# Patient Record
Sex: MALE | Race: WHITE | NOT HISPANIC OR LATINO | Employment: OTHER | URBAN - METROPOLITAN AREA
[De-identification: names, ages, dates, MRNs, and addresses within clinical notes are randomized per-mention and may not be internally consistent; named-entity substitution may affect disease eponyms.]

---

## 2018-10-16 ENCOUNTER — TELEPHONE (OUTPATIENT)
Dept: CARDIOLOGY | Facility: MEDICAL CENTER | Age: 78
End: 2018-10-16

## 2018-10-25 ENCOUNTER — OFFICE VISIT (OUTPATIENT)
Dept: CARDIOLOGY | Facility: MEDICAL CENTER | Age: 78
End: 2018-10-25
Payer: MEDICARE

## 2018-10-25 VITALS
OXYGEN SATURATION: 98 % | HEART RATE: 60 BPM | DIASTOLIC BLOOD PRESSURE: 60 MMHG | HEIGHT: 71 IN | SYSTOLIC BLOOD PRESSURE: 130 MMHG | WEIGHT: 264.77 LBS | BODY MASS INDEX: 37.07 KG/M2

## 2018-10-25 DIAGNOSIS — I10 ESSENTIAL HYPERTENSION: ICD-10-CM

## 2018-10-25 LAB — EKG IMPRESSION: NORMAL

## 2018-10-25 PROCEDURE — 93000 ELECTROCARDIOGRAM COMPLETE: CPT | Performed by: INTERNAL MEDICINE

## 2018-10-25 PROCEDURE — 99204 OFFICE O/P NEW MOD 45 MIN: CPT | Mod: 25 | Performed by: INTERNAL MEDICINE

## 2018-10-25 RX ORDER — CARVEDILOL 25 MG/1
TABLET ORAL
COMMUNITY
Start: 2018-06-18

## 2018-10-25 RX ORDER — CHLORAL HYDRATE 500 MG
1000 CAPSULE ORAL
COMMUNITY

## 2018-10-25 RX ORDER — CETIRIZINE HYDROCHLORIDE 10 MG/1
10 TABLET ORAL
COMMUNITY

## 2018-10-25 RX ORDER — DUTASTERIDE 0.5 MG/1
0.5 CAPSULE, LIQUID FILLED ORAL
COMMUNITY
Start: 2018-06-18

## 2018-10-25 RX ORDER — QUINAPRIL 40 MG/1
40 TABLET ORAL
COMMUNITY
Start: 2018-06-18

## 2018-10-25 RX ORDER — TRIAMCINOLONE ACETONIDE 1 MG/G
1 CREAM TOPICAL
COMMUNITY

## 2018-10-25 RX ORDER — AMLODIPINE BESYLATE 10 MG/1
10 TABLET ORAL
COMMUNITY
Start: 2018-10-08

## 2018-10-25 ASSESSMENT — ENCOUNTER SYMPTOMS
DECREASED LIBIDO: 0
COUGH: 0
NAIL CHANGES: 0
CLAUDICATION: 0
BACK PAIN: 0
BRIEF PARALYSIS: 0
DECREASED APPETITE: 0
ALTERED MENTAL STATUS: 0
ABDOMINAL PAIN: 0
APHONIA: 0
COLOR CHANGE: 0
DEPRESSION: 0
ADENOPATHY: 0
EYE DISCHARGE: 0
BLOATING: 0
CHILLS: 0
HEMOPTYSIS: 0
BLURRED VISION: 0

## 2018-10-25 NOTE — PROGRESS NOTES
Cardiology Initial Consultation Note    Date of note:    10/25/2018    Primary Care Provider: Asia Cox M.D.    Patient Name: Jose Childs     YOB: 1940  MRN:              [id number]    Chief Complaint: Pre-op cardiovascular exam, HTN    Jose Childs is a 78 y.o. male recently moved to [place] to establish care with cardiology and get preop cardiovascular evaluation.  Patient is to follow-up with [facility], he moved to [place] to be closer to his kids.  He takes care of his wife who has dementia.  He is going through a lot of stress.  The last few months he was diagnosed with prostate cancer underwent radiation therapy.  After the radiation therapy he has been noticing swelling in his feet, which usually disappear in the morning when he wakes up.  He is not very active physically, walks daily.  Denies chest pain shortness of breath with activity.  No prior cardiovascular history.  No prior history of CVA.  He has been having significant left knee pain, hoping to get left knee replacement soon.    Review of Systems   Constitution: Negative for chills and decreased appetite.   HENT: Negative for congestion and ear discharge.    Eyes: Negative for blurred vision and discharge.   Cardiovascular: Negative for chest pain and claudication.   Respiratory: Negative for cough and hemoptysis.    Endocrine: Negative for cold intolerance and heat intolerance.   Hematologic/Lymphatic: Negative for adenopathy.   Skin: Negative for color change and nail changes.   Musculoskeletal: Negative for back pain.   Gastrointestinal: Negative for bloating and abdominal pain.   Genitourinary: Negative for bladder incontinence and decreased libido.   Neurological: Negative for aphonia and brief paralysis.   Psychiatric/Behavioral: Negative for altered mental status and depression.   Allergic/Immunologic: Negative for environmental allergies.         All other systems reviewed and discussed using a comprehensive  "questionnaire and are negative.     Past medical history, family history, social history, allergies and labs are reviewed and updated as needed as documented below.    Past Medical History:   Diagnosis Date   • Hypertension    Prostate cancer        Current Outpatient Prescriptions   Medication Sig Dispense Refill   • amLODIPine (NORVASC) 10 MG Tab Take 10 mg by mouth.     • carvedilol (COREG) 25 MG Tab TAKE 1 TAB BY MOUTH TWICE DAILY. TAKE 25 MG BY MOUTH TWICE DAILY WITH BREAKFAST AND DINNER.     • dutasteride (AVODART) 0.5 MG capsule Take 0.5 mg by mouth.     • Omega-3 Fatty Acids (FISH OIL) 1000 MG Cap capsule Take 1,000 mg by mouth.     • quinapril (ACCUPRIL) 40 MG tablet Take 40 mg by mouth.     • triamcinolone acetonide (KENALOG) 0.1 % Cream Apply 1 Application to affected area(s).     • cetirizine (ZYRTEC) 10 MG Tab Take 10 mg by mouth.     • Multiple Vitamin (MULTI-VITAMIN DAILY PO) Take  by mouth.       No current facility-administered medications for this visit.        No Known Allergies      Family History   Problem Relation Age of Onset   • Heart Disease Mother          Social History     Social History   • Marital status:      Spouse name: N/A   • Number of children: N/A   • Years of education: N/A     Occupational History   • Not on file.     Social History Main Topics   • Smoking status: Never Smoker   • Smokeless tobacco: Never Used   • Alcohol use No   • Drug use: Unknown   • Sexual activity: Not on file      Comment: 2-3 drinks daily prior to living with son     Other Topics Concern   • Not on file     Social History Narrative   • No narrative on file         Physical Exam:  Ambulatory Vitals  Blood pressure 130/60, pulse 60, height 1.803 m (5' 11\"), weight 120.1 kg (264 lb 12.4 oz), SpO2 98 %.   Oxygen Therapy:  Pulse Oximetry: 98 %  BP Readings from Last 4 Encounters:   10/25/18 130/60       Weight/BMI: Body mass index is 36.93 kg/m².  Wt Readings from Last 4 Encounters:   10/25/18 120.1 " kg (264 lb 12.4 oz)           General: Well appearing and in no apparent distress  Head: atrumatic  Eyes: No conjunctival pallor   ENT: normal external appearance of nose and ears  Neck: JVP absent, carotid bruits absent  Lungs: respiratory sounds  normal, additional breath sounds absent  Heart: Regular rhythm,   No palpable thrills on palpation, 2/6 systolic murmur aortic area, no rubs,   Lowe extremity edema present.   Pedal pulses normal  Abdomen: soft, non tender, non distended.  Extremities/MSK: no clubbing, no cyanosis  Neurological: normal orientation, Gait normal   Psychiatric: Appropriate affect, intact judgement and insight  Skin: Warm extremities        Cardiac Imaging and Procedures Review:    EKG dated 10/25/2018: My personal interpretation is sinus bradycardia, first-degree AV block.    Echo dated 2018 is reviewed:   Normal LV size, systolic function, moderately dilated left atrium mild aortic regurgitation mild aortic stenosis velocity across the aortic valve was 2.6 m/s.    Medical Decision Makin.  Preop cardiovascular examination prior to knee surgery  2.  Hypertension  3.  Prostate cancer, treated with radiation  4.  Lower extremity edema    He has no history of coronary artery disease, his echocardiogram is unremarkable except for mild aortic stenosis.  He has no symptoms of coronary disease clinically.  No history of CVA or kidney disease.  He will be low risk for cardiovascular events during surgery.  In my opinion he is a reasonable candidate for knee surgery.  His blood pressure is controlled on current regimen, will continue current therapy.  His lower extremity edema is likely related to lymphatic obstruction, he started noticing this after the radiation therapy for prostate cancer.  Advise conservative management like compression stockings, elevation of feet while sitting.  I recommend aspirin 81 mg daily, low to moderate intensity statin like Lipitor 10 mg for primary prevention  of coronary artery disease.      Return in about 1 year (around 10/25/2019).    This note was dictated using Dragon speech recognition software.    Celestine ZHENG  Interventional cardiologist  [facility], [facility].  [address] [place] [id number]  Phone: [phone]  Fax: [phone]

## 2018-10-25 NOTE — LETTER
[facility]   [address], [address]  [place] [address]  Phone: [phone]  Fax: [phone]              Jose Chlids  1940    Encounter Date: 10/25/2018    Celestine Espinoza M.D.          PROGRESS NOTE:      Cardiology Initial Consultation Note    Date of note:    10/25/2018    Primary Care Provider: Asia Cox M.D.    Patient Name: Jose Childs     YOB: 1940  MRN:              [id number]    Chief Complaint: Pre-op cardiovascular exam, HTN    Jose Childs is a 78 y.o. male recently moved to [place] to establish care with cardiology and get preop cardiovascular evaluation.  Patient is to follow-up with [facility], he moved to [place] to be closer to his kids.  He takes care of his wife who has dementia.  He is going through a lot of stress.  The last few months he was diagnosed with prostate cancer underwent radiation therapy.  After the radiation therapy he has been noticing swelling in his feet, which usually disappear in the morning when he wakes up.  He is not very active physically, walks daily.  Denies chest pain shortness of breath with activity.  No prior cardiovascular history.  No prior history of CVA.  He has been having significant left knee pain, hoping to get left knee replacement soon.    Review of Systems   Constitution: Negative for chills and decreased appetite.   HENT: Negative for congestion and ear discharge.    Eyes: Negative for blurred vision and discharge.   Cardiovascular: Negative for chest pain and claudication.   Respiratory: Negative for cough and hemoptysis.    Endocrine: Negative for cold intolerance and heat intolerance.   Hematologic/Lymphatic: Negative for adenopathy.   Skin: Negative for color change and nail changes.   Musculoskeletal: Negative for back pain.   Gastrointestinal: Negative for bloating and abdominal pain.   Genitourinary: Negative for bladder incontinence and decreased libido.      Neurological: Negative for aphonia and brief paralysis.   Psychiatric/Behavioral: Negative for altered mental status and depression.   Allergic/Immunologic: Negative for environmental allergies.         All other systems reviewed and discussed using a comprehensive questionnaire and are negative.     Past medical history, family history, social history, allergies and labs are reviewed and updated as needed as documented below.    Past Medical History:   Diagnosis Date   • Hypertension    Prostate cancer        Current Outpatient Prescriptions   Medication Sig Dispense Refill   • amLODIPine (NORVASC) 10 MG Tab Take 10 mg by mouth.     • carvedilol (COREG) 25 MG Tab TAKE 1 TAB BY MOUTH TWICE DAILY. TAKE 25 MG BY MOUTH TWICE DAILY WITH BREAKFAST AND DINNER.     • dutasteride (AVODART) 0.5 MG capsule Take 0.5 mg by mouth.     • Omega-3 Fatty Acids (FISH OIL) 1000 MG Cap capsule Take 1,000 mg by mouth.     • quinapril (ACCUPRIL) 40 MG tablet Take 40 mg by mouth.     • triamcinolone acetonide (KENALOG) 0.1 % Cream Apply 1 Application to affected area(s).     • cetirizine (ZYRTEC) 10 MG Tab Take 10 mg by mouth.     • Multiple Vitamin (MULTI-VITAMIN DAILY PO) Take  by mouth.       No current facility-administered medications for this visit.        No Known Allergies      Family History   Problem Relation Age of Onset   • Heart Disease Mother          Social History     Social History   • Marital status:      Spouse name: N/A   • Number of children: N/A   • Years of education: N/A     Occupational History   • Not on file.     Social History Main Topics   • Smoking status: Never Smoker   • Smokeless tobacco: Never Used   • Alcohol use No   • Drug use: Unknown   • Sexual activity: Not on file      Comment: 2-3 drinks daily prior to living with son     Other Topics Concern   • Not on file     Social History Narrative   • No narrative on file         Physical Exam:  Ambulatory Vitals  Blood pressure 130/60, pulse 60,  "height 1.803 m (5' 11\"), weight 120.1 kg (264 lb 12.4 oz), SpO2 98 %.   Oxygen Therapy:  Pulse Oximetry: 98 %  BP Readings from Last 4 Encounters:   10/25/18 130/60       Weight/BMI: Body mass index is 36.93 kg/m².  Wt Readings from Last 4 Encounters:   10/25/18 120.1 kg (264 lb 12.4 oz)           General: Well appearing and in no apparent distress  Head: atrumatic  Eyes: No conjunctival pallor   ENT: normal external appearance of nose and ears  Neck: JVP absent, carotid bruits absent  Lungs: respiratory sounds  normal, additional breath sounds absent  Heart: Regular rhythm,   No palpable thrills on palpation, 2/6 systolic murmur aortic area, no rubs,   Lowe extremity edema present.   Pedal pulses normal  Abdomen: soft, non tender, non distended.  Extremities/MSK: no clubbing, no cyanosis  Neurological: normal orientation, Gait normal   Psychiatric: Appropriate affect, intact judgement and insight  Skin: Warm extremities        Cardiac Imaging and Procedures Review:    EKG dated 10/25/2018: My personal interpretation is sinus bradycardia, first-degree AV block.    Echo dated 2018 is reviewed:   Normal LV size, systolic function, moderately dilated left atrium mild aortic regurgitation mild aortic stenosis velocity across the aortic valve was 2.6 m/s.    Medical Decision Makin.  Preop cardiovascular examination prior to knee surgery  2.  Hypertension  3.  Prostate cancer, treated with radiation  4.  Lower extremity edema    He has no history of coronary artery disease, his echocardiogram is unremarkable except for mild aortic stenosis.  He has no symptoms of coronary disease clinically.  No history of CVA or kidney disease.  He will be low risk for cardiovascular events during surgery.  In my opinion he is a reasonable candidate for knee surgery.  His blood pressure is controlled on current regimen, will continue current therapy.  His lower extremity edema is likely related to lymphatic obstruction, he " started noticing this after the radiation therapy for prostate cancer.  Advise conservative management like compression stockings, elevation of feet while sitting.  I recommend aspirin 81 mg daily, low to moderate intensity statin like Lipitor 10 mg for primary prevention of coronary artery disease.      Return in about 1 year (around 10/25/2019).    This note was dictated using Dragon speech recognition software.    Celestine ZHENG  Interventional cardiologist  [facility], [facility]  [address] [address]  Phone: [phone]  Fax: [phone]                  No Recipients

## 2018-11-16 ENCOUNTER — TELEPHONE (OUTPATIENT)
Dept: CARDIOLOGY | Facility: MEDICAL CENTER | Age: 78
End: 2018-11-16

## 2018-11-16 NOTE — TELEPHONE ENCOUNTER
Received cardiac clearance request from [facility] for pt upcoming left total knee replacement with Dr. Keys on 11/27.     Pt is not on a blood thinner.     Pt saw Dr. Espinoza for pre-op cardiovascular exam on 10/25 and states in progress note:     He will be low risk for cardiovascular events during surgery.  In my opinion he is a reasonable candidate for knee surgery.    The progress note underlining clearance information and EKG faxed to [facility] pre admissions testing at [phone].

## 2019-04-20 ENCOUNTER — HOSPITAL ENCOUNTER (INPATIENT)
Dept: HOSPITAL 8 - ED | Age: 79
LOS: 3 days | Discharge: HOME | DRG: 871 | End: 2019-04-23
Attending: HOSPITALIST | Admitting: HOSPITALIST
Payer: MEDICARE

## 2019-04-20 VITALS — BODY MASS INDEX: 35.4 KG/M2 | WEIGHT: 252.87 LBS | HEIGHT: 71 IN

## 2019-04-20 VITALS — DIASTOLIC BLOOD PRESSURE: 62 MMHG | SYSTOLIC BLOOD PRESSURE: 144 MMHG

## 2019-04-20 VITALS — SYSTOLIC BLOOD PRESSURE: 126 MMHG | DIASTOLIC BLOOD PRESSURE: 63 MMHG

## 2019-04-20 DIAGNOSIS — Z96.652: ICD-10-CM

## 2019-04-20 DIAGNOSIS — D64.9: ICD-10-CM

## 2019-04-20 DIAGNOSIS — I50.41: ICD-10-CM

## 2019-04-20 DIAGNOSIS — I35.0: ICD-10-CM

## 2019-04-20 DIAGNOSIS — I11.0: ICD-10-CM

## 2019-04-20 DIAGNOSIS — R65.10: ICD-10-CM

## 2019-04-20 DIAGNOSIS — Z87.891: ICD-10-CM

## 2019-04-20 DIAGNOSIS — D72.829: ICD-10-CM

## 2019-04-20 DIAGNOSIS — D68.69: ICD-10-CM

## 2019-04-20 DIAGNOSIS — J15.9: ICD-10-CM

## 2019-04-20 DIAGNOSIS — A41.9: Primary | ICD-10-CM

## 2019-04-20 DIAGNOSIS — Z85.46: ICD-10-CM

## 2019-04-20 DIAGNOSIS — I48.91: ICD-10-CM

## 2019-04-20 DIAGNOSIS — G47.33: ICD-10-CM

## 2019-04-20 LAB
ALBUMIN SERPL-MCNC: 3.2 G/DL (ref 3.4–5)
ALP SERPL-CCNC: 70 U/L (ref 45–117)
ALT SERPL-CCNC: 42 U/L (ref 12–78)
ANION GAP SERPL CALC-SCNC: 9 MMOL/L (ref 5–15)
APTT BLD: 34 SECONDS (ref 25–31)
BASOPHILS # BLD AUTO: 0.05 X10^3/UL (ref 0–0.1)
BASOPHILS NFR BLD AUTO: 0 % (ref 0–1)
BILIRUB SERPL-MCNC: 1.8 MG/DL (ref 0.2–1)
CALCIUM SERPL-MCNC: 8.6 MG/DL (ref 8.5–10.1)
CHLORIDE SERPL-SCNC: 102 MMOL/L (ref 98–107)
CREAT SERPL-MCNC: 0.92 MG/DL (ref 0.7–1.3)
EOSINOPHIL # BLD AUTO: 0 X10^3/UL (ref 0–0.4)
EOSINOPHIL NFR BLD AUTO: 0 % (ref 1–7)
ERYTHROCYTE [DISTWIDTH] IN BLOOD BY AUTOMATED COUNT: 14.9 % (ref 9.4–14.8)
INR PPP: 1.09 (ref 0.93–1.1)
LYMPHOCYTES # BLD AUTO: 1.27 X10^3/UL (ref 1–3.4)
LYMPHOCYTES NFR BLD AUTO: 10 % (ref 22–44)
MCH RBC QN AUTO: 29 PG (ref 27.5–34.5)
MCHC RBC AUTO-ENTMCNC: 33.5 G/DL (ref 33.2–36.2)
MCV RBC AUTO: 86.7 FL (ref 81–97)
MD: NO
MONOCYTES # BLD AUTO: 1.15 X10^3/UL (ref 0.2–0.8)
MONOCYTES NFR BLD AUTO: 9 % (ref 2–9)
NEUTROPHILS # BLD AUTO: 10.06 X10^3/UL (ref 1.8–6.8)
NEUTROPHILS NFR BLD AUTO: 80 % (ref 42–75)
PLATELET # BLD AUTO: 283 X10^3/UL (ref 130–400)
PMV BLD AUTO: 9.5 FL (ref 7.4–10.4)
PROT SERPL-MCNC: 7.2 G/DL (ref 6.4–8.2)
PROTHROMBIN TIME: 11.4 SECONDS (ref 9.6–11.5)
RBC # BLD AUTO: 4.15 X10^6/UL (ref 4.38–5.82)
T4 FREE SERPL-MCNC: 1.49 NG/DL (ref 0.76–1.46)
TROPONIN I SERPL-MCNC: < 0.015 NG/ML (ref 0–0.04)

## 2019-04-20 PROCEDURE — 93306 TTE W/DOPPLER COMPLETE: CPT

## 2019-04-20 PROCEDURE — 84145 PROCALCITONIN (PCT): CPT

## 2019-04-20 PROCEDURE — 85025 COMPLETE CBC W/AUTO DIFF WBC: CPT

## 2019-04-20 PROCEDURE — 83735 ASSAY OF MAGNESIUM: CPT

## 2019-04-20 PROCEDURE — 85610 PROTHROMBIN TIME: CPT

## 2019-04-20 PROCEDURE — 83550 IRON BINDING TEST: CPT

## 2019-04-20 PROCEDURE — 87400 INFLUENZA A/B EACH AG IA: CPT

## 2019-04-20 PROCEDURE — 81003 URINALYSIS AUTO W/O SCOPE: CPT

## 2019-04-20 PROCEDURE — 84484 ASSAY OF TROPONIN QUANT: CPT

## 2019-04-20 PROCEDURE — 87040 BLOOD CULTURE FOR BACTERIA: CPT

## 2019-04-20 PROCEDURE — 99285 EMERGENCY DEPT VISIT HI MDM: CPT

## 2019-04-20 PROCEDURE — 84439 ASSAY OF FREE THYROXINE: CPT

## 2019-04-20 PROCEDURE — 84443 ASSAY THYROID STIM HORMONE: CPT

## 2019-04-20 PROCEDURE — 83605 ASSAY OF LACTIC ACID: CPT

## 2019-04-20 PROCEDURE — 83540 ASSAY OF IRON: CPT

## 2019-04-20 PROCEDURE — 80053 COMPREHEN METABOLIC PANEL: CPT

## 2019-04-20 PROCEDURE — 96374 THER/PROPH/DIAG INJ IV PUSH: CPT

## 2019-04-20 PROCEDURE — 36415 COLL VENOUS BLD VENIPUNCTURE: CPT

## 2019-04-20 PROCEDURE — 83880 ASSAY OF NATRIURETIC PEPTIDE: CPT

## 2019-04-20 PROCEDURE — 85730 THROMBOPLASTIN TIME PARTIAL: CPT

## 2019-04-20 PROCEDURE — 93005 ELECTROCARDIOGRAM TRACING: CPT

## 2019-04-20 RX ADMIN — DOXYCYCLINE SCH MLS/HR: 100 INJECTION, POWDER, LYOPHILIZED, FOR SOLUTION INTRAVENOUS at 16:56

## 2019-04-20 RX ADMIN — HEPARIN SODIUM SCH UNITS: 5000 INJECTION, SOLUTION INTRAVENOUS; SUBCUTANEOUS at 16:31

## 2019-04-20 RX ADMIN — GUAIFENESIN SCH MG: 200 TABLET ORAL at 20:32

## 2019-04-20 RX ADMIN — GUAIFENESIN SCH MG: 200 TABLET ORAL at 16:30

## 2019-04-20 RX ADMIN — METOPROLOL TARTRATE SCH MG: 25 TABLET, FILM COATED ORAL at 16:56

## 2019-04-21 VITALS — DIASTOLIC BLOOD PRESSURE: 69 MMHG | SYSTOLIC BLOOD PRESSURE: 151 MMHG

## 2019-04-21 VITALS — DIASTOLIC BLOOD PRESSURE: 63 MMHG | SYSTOLIC BLOOD PRESSURE: 126 MMHG

## 2019-04-21 VITALS — SYSTOLIC BLOOD PRESSURE: 138 MMHG | DIASTOLIC BLOOD PRESSURE: 67 MMHG

## 2019-04-21 VITALS — DIASTOLIC BLOOD PRESSURE: 54 MMHG | SYSTOLIC BLOOD PRESSURE: 128 MMHG

## 2019-04-21 VITALS — SYSTOLIC BLOOD PRESSURE: 127 MMHG | DIASTOLIC BLOOD PRESSURE: 68 MMHG

## 2019-04-21 LAB
ALBUMIN SERPL-MCNC: 2.8 G/DL (ref 3.4–5)
ALP SERPL-CCNC: 62 U/L (ref 45–117)
ALT SERPL-CCNC: 52 U/L (ref 12–78)
ANION GAP SERPL CALC-SCNC: 8 MMOL/L (ref 5–15)
BASOPHILS # BLD AUTO: 0.02 X10^3/UL (ref 0–0.1)
BASOPHILS NFR BLD AUTO: 0 % (ref 0–1)
BILIRUB SERPL-MCNC: 1.3 MG/DL (ref 0.2–1)
CALCIUM SERPL-MCNC: 8.2 MG/DL (ref 8.5–10.1)
CHLORIDE SERPL-SCNC: 104 MMOL/L (ref 98–107)
CREAT SERPL-MCNC: 0.79 MG/DL (ref 0.7–1.3)
EOSINOPHIL # BLD AUTO: 0.12 X10^3/UL (ref 0–0.4)
EOSINOPHIL NFR BLD AUTO: 1 % (ref 1–7)
ERYTHROCYTE [DISTWIDTH] IN BLOOD BY AUTOMATED COUNT: 15 % (ref 9.4–14.8)
LYMPHOCYTES # BLD AUTO: 1.46 X10^3/UL (ref 1–3.4)
LYMPHOCYTES NFR BLD AUTO: 14 % (ref 22–44)
MCH RBC QN AUTO: 29.2 PG (ref 27.5–34.5)
MCHC RBC AUTO-ENTMCNC: 33.5 G/DL (ref 33.2–36.2)
MCV RBC AUTO: 87 FL (ref 81–97)
MD: NO
MONOCYTES # BLD AUTO: 1.06 X10^3/UL (ref 0.2–0.8)
MONOCYTES NFR BLD AUTO: 10 % (ref 2–9)
NEUTROPHILS # BLD AUTO: 8.17 X10^3/UL (ref 1.8–6.8)
NEUTROPHILS NFR BLD AUTO: 76 % (ref 42–75)
PLATELET # BLD AUTO: 253 X10^3/UL (ref 130–400)
PMV BLD AUTO: 9.3 FL (ref 7.4–10.4)
PROT SERPL-MCNC: 6.5 G/DL (ref 6.4–8.2)
RBC # BLD AUTO: 3.74 X10^6/UL (ref 4.38–5.82)
TROPONIN I SERPL-MCNC: < 0.015 NG/ML (ref 0–0.04)

## 2019-04-21 RX ADMIN — CEFTRIAXONE SCH MLS/HR: 1 INJECTION, SOLUTION INTRAVENOUS at 13:53

## 2019-04-21 RX ADMIN — GUAIFENESIN SCH MG: 200 TABLET ORAL at 20:17

## 2019-04-21 RX ADMIN — DOXYCYCLINE SCH MLS/HR: 100 INJECTION, POWDER, LYOPHILIZED, FOR SOLUTION INTRAVENOUS at 05:18

## 2019-04-21 RX ADMIN — HEPARIN SODIUM SCH UNITS: 5000 INJECTION, SOLUTION INTRAVENOUS; SUBCUTANEOUS at 00:10

## 2019-04-21 RX ADMIN — GUAIFENESIN SCH MG: 200 TABLET ORAL at 11:44

## 2019-04-21 RX ADMIN — GUAIFENESIN SCH MG: 200 TABLET ORAL at 06:38

## 2019-04-21 RX ADMIN — METOPROLOL TARTRATE SCH MG: 25 TABLET, FILM COATED ORAL at 18:05

## 2019-04-21 RX ADMIN — HEPARIN SODIUM SCH UNITS: 5000 INJECTION, SOLUTION INTRAVENOUS; SUBCUTANEOUS at 08:55

## 2019-04-21 RX ADMIN — GUAIFENESIN SCH MG: 200 TABLET ORAL at 18:01

## 2019-04-21 RX ADMIN — DOXYCYCLINE SCH MLS/HR: 100 INJECTION, POWDER, LYOPHILIZED, FOR SOLUTION INTRAVENOUS at 18:05

## 2019-04-21 RX ADMIN — METOPROLOL TARTRATE SCH MG: 25 TABLET, FILM COATED ORAL at 06:39

## 2019-04-21 RX ADMIN — POTASSIUM CHLORIDE SCH MEQ: 20 TABLET, EXTENDED RELEASE ORAL at 18:01

## 2019-04-21 RX ADMIN — APIXABAN SCH MG: 5 TABLET, FILM COATED ORAL at 20:17

## 2019-04-22 VITALS — SYSTOLIC BLOOD PRESSURE: 152 MMHG | DIASTOLIC BLOOD PRESSURE: 74 MMHG

## 2019-04-22 VITALS — SYSTOLIC BLOOD PRESSURE: 130 MMHG | DIASTOLIC BLOOD PRESSURE: 69 MMHG

## 2019-04-22 VITALS — DIASTOLIC BLOOD PRESSURE: 69 MMHG | SYSTOLIC BLOOD PRESSURE: 134 MMHG

## 2019-04-22 VITALS — SYSTOLIC BLOOD PRESSURE: 127 MMHG | DIASTOLIC BLOOD PRESSURE: 66 MMHG

## 2019-04-22 LAB
% IRON SATURATION: 11 % (ref 20–55)
ALBUMIN SERPL-MCNC: 3 G/DL (ref 3.4–5)
ALP SERPL-CCNC: 77 U/L (ref 45–117)
ALT SERPL-CCNC: 87 U/L (ref 12–78)
ANION GAP SERPL CALC-SCNC: 7 MMOL/L (ref 5–15)
BASOPHILS # BLD AUTO: 0.14 X10^3/UL (ref 0–0.1)
BASOPHILS NFR BLD AUTO: 1 % (ref 0–1)
BILIRUB SERPL-MCNC: 0.9 MG/DL (ref 0.2–1)
CALCIUM SERPL-MCNC: 8.6 MG/DL (ref 8.5–10.1)
CHLORIDE SERPL-SCNC: 107 MMOL/L (ref 98–107)
CREAT SERPL-MCNC: 0.71 MG/DL (ref 0.7–1.3)
EOSINOPHIL # BLD AUTO: 0.26 X10^3/UL (ref 0–0.4)
EOSINOPHIL NFR BLD AUTO: 2 % (ref 1–7)
ERYTHROCYTE [DISTWIDTH] IN BLOOD BY AUTOMATED COUNT: 15 % (ref 9.4–14.8)
IRON LEVEL: 27 MCG/DL (ref 65–175)
LYMPHOCYTES # BLD AUTO: 1.38 X10^3/UL (ref 1–3.4)
LYMPHOCYTES NFR BLD AUTO: 10 % (ref 22–44)
MCH RBC QN AUTO: 29.8 PG (ref 27.5–34.5)
MCHC RBC AUTO-ENTMCNC: 34 G/DL (ref 33.2–36.2)
MCV RBC AUTO: 87.6 FL (ref 81–97)
MD: (no result)
MONOCYTES # BLD AUTO: 0.83 X10^3/UL (ref 0.2–0.8)
MONOCYTES NFR BLD AUTO: 6 % (ref 2–9)
NEUTROPHILS # BLD AUTO: 11.44 X10^3/UL (ref 1.8–6.8)
NEUTROPHILS NFR BLD AUTO: 82 % (ref 42–75)
PLATELET # BLD AUTO: 290 X10^3/UL (ref 130–400)
PMV BLD AUTO: 9.2 FL (ref 7.4–10.4)
PROT SERPL-MCNC: 7.2 G/DL (ref 6.4–8.2)
RBC # BLD AUTO: 4.24 X10^6/UL (ref 4.38–5.82)
TIBC SERPL-MCNC: 239 MCG/DL (ref 250–450)

## 2019-04-22 RX ADMIN — GUAIFENESIN SCH MG: 200 TABLET ORAL at 05:37

## 2019-04-22 RX ADMIN — GUAIFENESIN SCH MG: 200 TABLET ORAL at 20:34

## 2019-04-22 RX ADMIN — METOPROLOL TARTRATE SCH MG: 25 TABLET, FILM COATED ORAL at 05:37

## 2019-04-22 RX ADMIN — CEFTRIAXONE SCH MLS/HR: 1 INJECTION, SOLUTION INTRAVENOUS at 17:58

## 2019-04-22 RX ADMIN — POTASSIUM CHLORIDE SCH MEQ: 20 TABLET, EXTENDED RELEASE ORAL at 17:58

## 2019-04-22 RX ADMIN — METOPROLOL TARTRATE SCH MG: 25 TABLET, FILM COATED ORAL at 17:59

## 2019-04-22 RX ADMIN — DOXYCYCLINE SCH MLS/HR: 100 INJECTION, POWDER, LYOPHILIZED, FOR SOLUTION INTRAVENOUS at 17:59

## 2019-04-22 RX ADMIN — DOXYCYCLINE SCH MLS/HR: 100 INJECTION, POWDER, LYOPHILIZED, FOR SOLUTION INTRAVENOUS at 05:12

## 2019-04-22 RX ADMIN — GUAIFENESIN SCH MG: 200 TABLET ORAL at 12:59

## 2019-04-22 RX ADMIN — GUAIFENESIN SCH MG: 200 TABLET ORAL at 17:58

## 2019-04-22 RX ADMIN — APIXABAN SCH MG: 5 TABLET, FILM COATED ORAL at 08:55

## 2019-04-22 RX ADMIN — APIXABAN SCH MG: 5 TABLET, FILM COATED ORAL at 20:34

## 2019-04-22 RX ADMIN — POTASSIUM CHLORIDE SCH MEQ: 20 TABLET, EXTENDED RELEASE ORAL at 08:55

## 2019-04-23 VITALS — DIASTOLIC BLOOD PRESSURE: 57 MMHG | SYSTOLIC BLOOD PRESSURE: 127 MMHG

## 2019-04-23 VITALS — DIASTOLIC BLOOD PRESSURE: 66 MMHG | SYSTOLIC BLOOD PRESSURE: 147 MMHG

## 2019-04-23 LAB
ALBUMIN SERPL-MCNC: 2.8 G/DL (ref 3.4–5)
ALP SERPL-CCNC: 68 U/L (ref 45–117)
ALT SERPL-CCNC: 84 U/L (ref 12–78)
ANION GAP SERPL CALC-SCNC: 5 MMOL/L (ref 5–15)
BASOPHILS # BLD AUTO: 0.06 X10^3/UL (ref 0–0.1)
BASOPHILS NFR BLD AUTO: 1 % (ref 0–1)
BILIRUB SERPL-MCNC: 0.6 MG/DL (ref 0.2–1)
CALCIUM SERPL-MCNC: 8.5 MG/DL (ref 8.5–10.1)
CHLORIDE SERPL-SCNC: 109 MMOL/L (ref 98–107)
CREAT SERPL-MCNC: 0.74 MG/DL (ref 0.7–1.3)
CULTURE INDICATED?: NO
EOSINOPHIL # BLD AUTO: 0.41 X10^3/UL (ref 0–0.4)
EOSINOPHIL NFR BLD AUTO: 4 % (ref 1–7)
ERYTHROCYTE [DISTWIDTH] IN BLOOD BY AUTOMATED COUNT: 14.7 % (ref 9.4–14.8)
LYMPHOCYTES # BLD AUTO: 1.58 X10^3/UL (ref 1–3.4)
LYMPHOCYTES NFR BLD AUTO: 14 % (ref 22–44)
MCH RBC QN AUTO: 29.9 PG (ref 27.5–34.5)
MCHC RBC AUTO-ENTMCNC: 34.2 G/DL (ref 33.2–36.2)
MCV RBC AUTO: 87.5 FL (ref 81–97)
MD: NO
MICROSCOPIC: (no result)
MONOCYTES # BLD AUTO: 0.83 X10^3/UL (ref 0.2–0.8)
MONOCYTES NFR BLD AUTO: 8 % (ref 2–9)
NEUTROPHILS # BLD AUTO: 8.05 X10^3/UL (ref 1.8–6.8)
NEUTROPHILS NFR BLD AUTO: 74 % (ref 42–75)
PLATELET # BLD AUTO: 308 X10^3/UL (ref 130–400)
PMV BLD AUTO: 8.8 FL (ref 7.4–10.4)
PROT SERPL-MCNC: 6.4 G/DL (ref 6.4–8.2)
RBC # BLD AUTO: 3.92 X10^6/UL (ref 4.38–5.82)

## 2019-04-23 RX ADMIN — METOPROLOL TARTRATE SCH MG: 25 TABLET, FILM COATED ORAL at 05:32

## 2019-04-23 RX ADMIN — APIXABAN SCH MG: 5 TABLET, FILM COATED ORAL at 09:10

## 2019-04-23 RX ADMIN — GUAIFENESIN SCH MG: 200 TABLET ORAL at 05:31

## 2019-10-25 ENCOUNTER — HOSPITAL ENCOUNTER (OUTPATIENT)
Dept: HOSPITAL 8 - CFH | Age: 79
Discharge: HOME | End: 2019-10-25
Attending: NURSE PRACTITIONER
Payer: MEDICARE

## 2019-10-25 DIAGNOSIS — E78.5: ICD-10-CM

## 2019-10-25 DIAGNOSIS — I48.91: ICD-10-CM

## 2019-10-25 DIAGNOSIS — I08.0: Primary | ICD-10-CM

## 2019-10-25 DIAGNOSIS — I10: ICD-10-CM

## 2019-10-25 PROCEDURE — 93306 TTE W/DOPPLER COMPLETE: CPT

## 2020-01-27 ENCOUNTER — HOSPITAL ENCOUNTER (OUTPATIENT)
Dept: HOSPITAL 8 - CVU | Age: 80
Discharge: HOME | End: 2020-01-27
Attending: INTERNAL MEDICINE
Payer: MEDICARE

## 2020-01-27 DIAGNOSIS — E66.01: ICD-10-CM

## 2020-01-27 DIAGNOSIS — R09.89: ICD-10-CM

## 2020-01-27 DIAGNOSIS — I10: ICD-10-CM

## 2020-01-27 DIAGNOSIS — I65.23: Primary | ICD-10-CM

## 2020-01-27 PROCEDURE — 93880 EXTRACRANIAL BILAT STUDY: CPT

## 2020-07-30 ENCOUNTER — HOSPITAL ENCOUNTER (OUTPATIENT)
Dept: HOSPITAL 8 - CVU | Age: 80
Discharge: HOME | End: 2020-07-30
Attending: INTERNAL MEDICINE
Payer: MEDICARE

## 2020-07-30 DIAGNOSIS — I08.0: ICD-10-CM

## 2020-07-30 DIAGNOSIS — I11.9: Primary | ICD-10-CM

## 2020-07-30 PROCEDURE — 93356 MYOCRD STRAIN IMG SPCKL TRCK: CPT

## 2020-07-30 PROCEDURE — 93306 TTE W/DOPPLER COMPLETE: CPT

## 2020-08-15 PROBLEM — I35.0 SEVERE AORTIC STENOSIS: Status: ACTIVE | Noted: 2020-08-15

## 2020-08-15 NOTE — PROGRESS NOTES
REFERRING PHYSICIAN: Dexter Mata MD.     CONSULTING PHYSICIAN: Lazaro Johnson DO.     CHIEF COMPLAINT: Fatigue, dizziness, exertional dyspnea.     HISTORY OF PRESENT ILLNESS: The patient is a 80 y.o. male with history significant for HTN, paroxysmal atrial fibrillation on Eliquis, and aortic stenosis.  Recently seen for an increase in dyspnea on exertion.  Repeat echocardiogram showed progression of aortic valve stenosis to severe.  Today he states his dyspnea on exertion and fatigue has progressively worsened. If he exerts himself he gets dizziness.  He also has some LE edema. He denies chest pain/pressure, syncope, or near syncope.  Patient recently had knee replacement, and during his recovery was found to be short of breath.  This prompted a physician's visit where he was discovered to have a murmur.    PAST MEDICAL HISTORY:   Past Medical History:   Diagnosis Date   • Heart murmur    • Hyperlipidemia    • Hypertension        PAST SURGICAL HISTORY:   Past Surgical History:   Procedure Laterality Date   • ARTHROPLASTY     • PROSTATECTOMY, RADICAL RETRO         FAMILY HISTORY:   Family History   Problem Relation Age of Onset   • Heart Disease Mother         SOCIAL HISTORY:   Social History     Socioeconomic History   • Marital status:      Spouse name: Not on file   • Number of children: Not on file   • Years of education: Not on file   • Highest education level: Not on file   Occupational History   • Not on file   Social Needs   • Financial resource strain: Not on file   • Food insecurity     Worry: Not on file     Inability: Not on file   • Transportation needs     Medical: Not on file     Non-medical: Not on file   Tobacco Use   • Smoking status: Never Smoker   • Smokeless tobacco: Never Used   Substance and Sexual Activity   • Alcohol use: No   • Drug use: Not on file   • Sexual activity: Not on file     Comment: 2-3 drinks daily prior to living with son   Lifestyle   • Physical activity      Days per week: Not on file     Minutes per session: Not on file   • Stress: Not on file   Relationships   • Social connections     Talks on phone: Not on file     Gets together: Not on file     Attends Zoroastrian service: Not on file     Active member of club or organization: Not on file     Attends meetings of clubs or organizations: Not on file     Relationship status: Not on file   • Intimate partner violence     Fear of current or ex partner: Not on file     Emotionally abused: Not on file     Physically abused: Not on file     Forced sexual activity: Not on file   Other Topics Concern   • Not on file   Social History Narrative   • Not on file       ALLERGIES:   No Known Allergies     CURRENT MEDICATIONS:     Current Outpatient Medications:   •  quinapril (ACCUPRIL) 40 MG tablet, Take 40 mg by mouth., Disp: , Rfl:   •  albuterol 108 (90 Base) MCG/ACT Aero Soln inhalation aerosol, albuterol sulfate hfa 108 (90 base) mcg/act aers, Disp: , Rfl:   •  metoprolol (LOPRESSOR) 25 MG Tab, metoprolol tartrate 25 mg tabs, Disp: , Rfl:   •  dutasteride (AVODART) 0.5 MG capsule, dutasteride 0.5 mg caps, Disp: , Rfl:   •  apixaban (ELIQUIS) 5mg Tab, eliquis 5 mg tabs, Disp: , Rfl:   •  amLODIPine (NORVASC) 10 MG Tab, amlodipine besylate 10 mg tabs, Disp: , Rfl:   •  amLODIPine (NORVASC) 10 MG Tab, Take 10 mg by mouth., Disp: , Rfl:   •  carvedilol (COREG) 25 MG Tab, TAKE 1 TAB BY MOUTH TWICE DAILY. TAKE 25 MG BY MOUTH TWICE DAILY WITH BREAKFAST AND DINNER., Disp: , Rfl:   •  dutasteride (AVODART) 0.5 MG capsule, Take 0.5 mg by mouth., Disp: , Rfl:   •  Omega-3 Fatty Acids (FISH OIL) 1000 MG Cap capsule, Take 1,000 mg by mouth., Disp: , Rfl:   •  triamcinolone acetonide (KENALOG) 0.1 % Cream, Apply 1 Application to affected area(s)., Disp: , Rfl:   •  cetirizine (ZYRTEC) 10 MG Tab, Take 10 mg by mouth., Disp: , Rfl:   •  Multiple Vitamin (MULTI-VITAMIN DAILY PO), Take  by mouth., Disp: , Rfl:      LABS REVIEWED:  None  available.     IMAGING REVIEWED AND INTERPRETED:    ECHOCARDIOGRAM: 7/30/2020  EF 60-65%.  Mild LVH.  Aortic valve heavily calcified.  Severe aortic stenosis.  Peak gradient 74mmHg, mean 41mmHg, TONY 0.6cm2.  Trace AI.  Mild MR.  Normal RVSP.    ANGIOGRAM: scheduled for 8/26/2020 [facility]    REVIEW OF SYSTEMS:   Review of Systems   Constitutional: Positive for malaise/fatigue. Negative for chills, diaphoresis, fever and weight loss.   HENT: Negative for congestion, ear pain, hearing loss, nosebleeds, sore throat and tinnitus.    Eyes: Negative for blurred vision, double vision, pain and discharge.   Respiratory: Positive for shortness of breath. Negative for cough, hemoptysis, sputum production, wheezing and stridor.    Cardiovascular: Positive for leg swelling. Negative for chest pain, palpitations and orthopnea.   Gastrointestinal: Negative for abdominal pain, constipation, heartburn, melena, nausea and vomiting.   Genitourinary: Negative for dysuria, flank pain and hematuria.   Musculoskeletal: Positive for joint pain. Negative for myalgias and neck pain.   Skin: Negative for itching and rash.   Neurological: Positive for dizziness. Negative for speech change, focal weakness, seizures, weakness and headaches.   Endo/Heme/Allergies: Negative for environmental allergies and polydipsia. Does not bruise/bleed easily.   Psychiatric/Behavioral: Negative for depression, hallucinations, substance abuse and suicidal ideas.       PHYSICAL EXAMINATION:   Physical Exam   Constitutional: He is oriented to person, place, and time and well-developed, well-nourished, and in no distress. No distress.   HENT:   Head: Normocephalic and atraumatic.   Nose: Nose normal.   Mouth/Throat: Oropharynx is clear and moist.   Eyes: Pupils are equal, round, and reactive to light. Conjunctivae and EOM are normal.   Neck: Normal range of motion. Neck supple. No JVD present. No tracheal deviation present.   Cardiovascular: Normal rate and regular  rhythm.   Murmur (3/6 systolic ejection murmur appreciated over the 2nd right intercostal space.) heard.  Pulmonary/Chest: Effort normal and breath sounds normal. No stridor. No respiratory distress.   Abdominal: Soft. Bowel sounds are normal. He exhibits no distension. There is no abdominal tenderness.   Musculoskeletal: Normal range of motion.         General: No tenderness or edema.   Neurological: He is alert and oriented to person, place, and time. Gait normal. Coordination normal. GCS score is 15.   Skin: Skin is warm and dry.   Psychiatric: Mood, memory and affect normal.     CONSTITUTIONAL:  /62 (BP Location: Right arm, Patient Position: Sitting, BP Cuff Size: Adult)   Pulse (!) 59   Temp 36.7 °C (98.1 °F) (Temporal)   SpO2 97%         IMPRESSION:  Severe symptomatic aortic stenosis, A. fib, COPD, hypertension, history of prostate cancer status post radiation, frailty      PLAN:  I recommend patient continue with his work-up for transcatheter aortic valve intervention.  He is yet to undergo his cardiac catheterization, so I did caution him that he may need open surgical intervention if we find indications of coronary artery disease that is surgical.  Barring that, I believe he is a appropriate candidate for TAVR.  I believe he would be moderate to high risk for surgical intervention mention given his lack of mobility, frailty, and age.  I discussed in detail with him the pros and cons of open versus transcatheter intervention.  Risk, benefits, alternatives were discussed in detail.  He is scheduled for cardiac catheterization in the upcoming week, and pending those results, will be scheduled for TAVR.    The procedure, its risks, benefits, potential complications and alternative treatments were discussed with the patient in detail including the risks should he decide not to undergo my recommended treatment. All of his questions were answered to his satisfaction and he is willing to proceed with the  operation. The risks include death, stroke,  infection: to include a rare bacterial infection related to the use of the heart/lung machine, shahzad-operative myocardial infarction, dysrhythmias, diaphragmatic paralysis, chest wall paresthesia, tracheostomy, kidney or other organ failure, possible return to the operating room for bleeding, bleeding requiring transfusion with its attendant risks including AIDS or hepatitis, dehiscence of surgical incisions, respiratory complications including the need for prolonged ventilator support, Protamine or other drug reaction, peripheral neuropathy, loss of limb, and miscount of surgical items. The operative mortality risk is approximately 5%. The STS mortality risk score is 3% and the morbidity and mortality risk score is 13%. The scores were discussed with patient.        Sincerely,       Lazaro Johnson DO.

## 2020-08-19 ENCOUNTER — OFFICE VISIT (OUTPATIENT)
Dept: CARDIOTHORACIC SURGERY | Facility: MEDICAL CENTER | Age: 80
End: 2020-08-19
Payer: MEDICARE

## 2020-08-19 VITALS
DIASTOLIC BLOOD PRESSURE: 62 MMHG | HEART RATE: 59 BPM | OXYGEN SATURATION: 97 % | TEMPERATURE: 98.1 F | SYSTOLIC BLOOD PRESSURE: 116 MMHG

## 2020-08-19 DIAGNOSIS — I35.0 SEVERE AORTIC STENOSIS: ICD-10-CM

## 2020-08-19 PROCEDURE — 99205 OFFICE O/P NEW HI 60 MIN: CPT | Performed by: THORACIC SURGERY (CARDIOTHORACIC VASCULAR SURGERY)

## 2020-08-19 RX ORDER — AMLODIPINE BESYLATE 10 MG/1
TABLET ORAL
COMMUNITY

## 2020-08-19 RX ORDER — DUTASTERIDE 0.5 MG/1
CAPSULE, LIQUID FILLED ORAL
COMMUNITY

## 2020-08-19 RX ORDER — ALBUTEROL SULFATE 90 UG/1
AEROSOL, METERED RESPIRATORY (INHALATION)
COMMUNITY

## 2020-08-19 ASSESSMENT — ENCOUNTER SYMPTOMS
DEPRESSION: 0
HEADACHES: 0
COUGH: 0
DOUBLE VISION: 0
VOMITING: 0
HALLUCINATIONS: 0
STRIDOR: 0
ORTHOPNEA: 0
WEIGHT LOSS: 0
CHILLS: 0
DIZZINESS: 1
PALPITATIONS: 0
CONSTIPATION: 0
BLURRED VISION: 0
WHEEZING: 0
FEVER: 0
SPEECH CHANGE: 0
POLYDIPSIA: 0
MYALGIAS: 0
EYE PAIN: 0
EYE DISCHARGE: 0
SPUTUM PRODUCTION: 0
NECK PAIN: 0
ABDOMINAL PAIN: 0
WEAKNESS: 0
SORE THROAT: 0
NAUSEA: 0
SEIZURES: 0
DIAPHORESIS: 0
FLANK PAIN: 0
HEARTBURN: 0
SHORTNESS OF BREATH: 1
HEMOPTYSIS: 0
FOCAL WEAKNESS: 0
BRUISES/BLEEDS EASILY: 0

## 2020-08-19 ASSESSMENT — LIFESTYLE VARIABLES: SUBSTANCE_ABUSE: 0

## 2021-01-14 DIAGNOSIS — Z23 NEED FOR VACCINATION: ICD-10-CM
